# Patient Record
Sex: FEMALE | Race: AMERICAN INDIAN OR ALASKA NATIVE
[De-identification: names, ages, dates, MRNs, and addresses within clinical notes are randomized per-mention and may not be internally consistent; named-entity substitution may affect disease eponyms.]

---

## 2018-11-30 ENCOUNTER — HOSPITAL ENCOUNTER (OUTPATIENT)
Dept: HOSPITAL 42 - CATH | Age: 78
LOS: 1 days | Discharge: HOME | End: 2018-12-01
Attending: INTERNAL MEDICINE
Payer: MEDICARE

## 2018-11-30 VITALS — BODY MASS INDEX: 28.8 KG/M2

## 2018-11-30 DIAGNOSIS — I10: ICD-10-CM

## 2018-11-30 DIAGNOSIS — E78.5: ICD-10-CM

## 2018-11-30 DIAGNOSIS — I25.10: Primary | ICD-10-CM

## 2018-11-30 DIAGNOSIS — E78.00: ICD-10-CM

## 2018-11-30 DIAGNOSIS — Z79.899: ICD-10-CM

## 2018-11-30 DIAGNOSIS — Z79.82: ICD-10-CM

## 2018-11-30 DIAGNOSIS — I25.82: ICD-10-CM

## 2018-11-30 DIAGNOSIS — Z98.61: ICD-10-CM

## 2018-11-30 DIAGNOSIS — G56.03: ICD-10-CM

## 2018-11-30 DIAGNOSIS — E11.40: ICD-10-CM

## 2018-11-30 LAB
APTT BLD: 54.8 SECONDS (ref 25.1–36.5)
BASOPHILS # BLD AUTO: 0.01 K/MM3 (ref 0–2)
BASOPHILS NFR BLD: 0.2 % (ref 0–3)
BUN SERPL-MCNC: 27 MG/DL (ref 7–21)
CALCIUM SERPL-MCNC: 9.8 MG/DL (ref 8.4–10.5)
EOSINOPHIL # BLD: 0.2 10*3/UL (ref 0–0.7)
EOSINOPHIL NFR BLD: 4 % (ref 1.5–5)
ERYTHROCYTE [DISTWIDTH] IN BLOOD BY AUTOMATED COUNT: 15.4 % (ref 11.5–14.5)
GFR NON-AFRICAN AMERICAN: 53
GRANULOCYTES # BLD: 2.74 10*3/UL (ref 1.4–6.5)
GRANULOCYTES NFR BLD: 52.7 % (ref 50–68)
HDLC SERPL-MCNC: 26 MG/DL (ref 29–60)
HGB BLD-MCNC: 10.2 G/DL (ref 12–16)
INR PPP: 1.03
LDLC SERPL-MCNC: 72 MG/DL (ref 0–129)
LYMPHOCYTES # BLD: 1.8 10*3/UL (ref 1.2–3.4)
LYMPHOCYTES NFR BLD AUTO: 35.4 % (ref 22–35)
MCH RBC QN AUTO: 22.1 PG (ref 25–35)
MCHC RBC AUTO-ENTMCNC: 31.3 G/DL (ref 31–37)
MCV RBC AUTO: 70.6 FL (ref 80–105)
MONOCYTES # BLD AUTO: 0.4 10*3/UL (ref 0.1–0.6)
MONOCYTES NFR BLD: 7.7 % (ref 1–6)
PLATELET # BLD: 222 10^3/UL (ref 120–450)
PMV BLD AUTO: 10.3 FL (ref 7–11)
PROTHROMBIN TIME: 11.9 SECONDS (ref 9.4–12.5)
RBC # BLD AUTO: 4.62 10^6/UL (ref 3.5–6.1)
WBC # BLD AUTO: 5.2 10^3/UL (ref 4.5–11)

## 2018-11-30 PROCEDURE — 80061 LIPID PANEL: CPT

## 2018-11-30 PROCEDURE — 86900 BLOOD TYPING SEROLOGIC ABO: CPT

## 2018-11-30 PROCEDURE — 93005 ELECTROCARDIOGRAM TRACING: CPT

## 2018-11-30 PROCEDURE — 85730 THROMBOPLASTIN TIME PARTIAL: CPT

## 2018-11-30 PROCEDURE — 93458 L HRT ARTERY/VENTRICLE ANGIO: CPT

## 2018-11-30 PROCEDURE — 85025 COMPLETE CBC W/AUTO DIFF WBC: CPT

## 2018-11-30 PROCEDURE — 86850 RBC ANTIBODY SCREEN: CPT

## 2018-11-30 PROCEDURE — 80048 BASIC METABOLIC PNL TOTAL CA: CPT

## 2018-11-30 PROCEDURE — 99153 MOD SED SAME PHYS/QHP EA: CPT

## 2018-11-30 PROCEDURE — 82948 REAGENT STRIP/BLOOD GLUCOSE: CPT

## 2018-11-30 PROCEDURE — 99152 MOD SED SAME PHYS/QHP 5/>YRS: CPT

## 2018-11-30 PROCEDURE — 85610 PROTHROMBIN TIME: CPT

## 2018-11-30 PROCEDURE — 36415 COLL VENOUS BLD VENIPUNCTURE: CPT

## 2018-11-30 RX ADMIN — INSULIN LISPRO SCH U: 100 INJECTION, SUSPENSION SUBCUTANEOUS at 17:53

## 2018-11-30 NOTE — CARDCATH
PROCEDURE DATE:  11/30/2018



HISTORY:  The patient in the 78-year-old woman with history of hypertension

and diabetes mellitus and hypercholesterolemia with a history of PTCA in

the past who presents with exertional shortness of breath progressing to

symptoms between one to two blocks.



The patient's stress test earlier this year was unremarkable.  Because of

her ongoing symptoms, cardiac catheterization was recommended.



PROCEDURE:  Left heart catheterization with coronary aortography and left

ventriculogram followed by percutaneous transluminal coronary angioplasty

and stent of the circumflex lesion.



The right femoral artery was cannulated with a 6-[place] sheath.  There were

no complications.



I performed moderate sedation which included the presence of an independent

trained observer that assisted in monitoring the patient's level of

consciousness and physiologic status.  After administration of Versed and

fentanyl, my intra service time was 30 minutes.



The findings on catheterization revealed a right dominant circulation.  The

RCA was occluded in its proximal portion which is chronic.



The left main artery was unremarkable.



The LAD revealed a patent stent in its midportion.  There is a small third

diagonal vessel that is 80-90% stenosis.



The circumflex artery and obtuse marginal branch revealed diffuse

atherosclerosis.  The second branch of the obtuse marginal branch revealed

a 80-90% stenosis.



LV function was preserved with an EF of 50-55%.



The patient started on intravenous Angiomax under fluoroscopic guide, the

guiding catheter was placed in the ostium of the left main artery and 0.014

ATW wire was used to cross the lesion in the OM.



A 2.25 x 12 mm drug-eluting stent was placed and deployed at 12 ounces of

pressure.  Repeat coronary artery revealed an excellent result.



No residual stenosis and ANUJ III flow.



Angio-Seal was used to close the femoral artery site.  The patient

tolerated the procedure well.



In summary, the procedure was successful PTCA and stent of an obtuse

marginal branch lesion with a drug-eluting stent.



Cardiac catheterization revealed a chronically occluded RCA and new lesion

in the OM.



In addition, the LAD stent was patent.  LV function is preserved.



Given these findings, the patient will need to undergo a strict cardiac

risk reduction program.  Continue on aspirin and Plavix with Plavix for at

least a year.







__________________________________________

Richy Jordan MD





DD:  11/30/2018 10:34:58

DT:  11/30/2018 10:39:29

Job # [id number]

## 2018-11-30 NOTE — HP
DATE OF EXAM:  11/30/2018



HISTORY OF PRESENT ILLNESS:  The patient is a 78-year-old who was found to

have abnormal stress test done on 10/15/2018.  She was brought electively

for cardiac cath and had circumflex stented, being placed in observation. 

Denies any chest pain or shortness of breath.  No nausea, vomiting or

diarrhea.  No abdominal pain.



PAST MEDICAL HISTORY:  Significant for;

1.  Hypertension.

2.  Non-insulin-dependent diabetes.

3.  Hyperlipidemia.

4.  History of diabetic neuropathy.

5.  Spinal stenosis.

6.  Bilateral carpal tunnel syndrome.



ALLERGIES:  SHE IS ALLERGIC TO SHELLFISH.



PAST SURGICAL HISTORY:  Significant for bilateral cataract extraction.



MEDICATIONS:  At home, the patient is on valsartan 320 daily, Plavix 75

daily, diltiazem 120 daily, tramadol one tablet every six hours as needed,

aspirin 81 daily, hydralazine 25 every six hours, simvastatin 10 mg daily.



SOCIAL HISTORY:  She lives by herself.  Denies smoking, drinking or alcohol

use.



PHYSICAL EXAMINATION

GENERAL:  The patient is awake and alert.  Able to communicate.

VITAL SIGNS:  The patient is afebrile, pulse 66, respirations 18, blood

pressure 154/69.

LUNGS:  Bilateral fair airflow.  No rhonchi or crackle.

HEART:  S1, S2 audible.

ABDOMEN:  Soft, nontender.  No rebound, no guarding.

NEUROLOGIC:  The patient is awake, alert, oriented.  Able to communicate.

EXTREMITIES:  Right groin has a pressure bandage.



LABORATORY DATA:  WBC 5.2, hemoglobin 10, hematocrit 32.6, platelet of 222.

PT 11.9, INR 1.03.  Chemistry; sodium 144, potassium 4.4, chloride 111, CO2

of 23, BUN 27, creatinine 1.0, blood sugar 158.



ASSESSMENT:

1.  Status post _____ circumflex angioplasty.

2.  History of coronary artery disease.

3.  Hypertension.

4.  Hyperlipidemia.

5.  Non-insulin-dependent diabetes.



PLAN:  So, plan is the patient will be placed in observation.  We will

monitor blood sugar.  We will resume her usual medication.  We will monitor

her closely and possible discharge in a.m. if the patient remains stable.







__________________________________________

Guille Cueva MD





DD:  11/30/2018 11:55:01

DT:  11/30/2018 12:44:07

Job # [id number]

## 2018-11-30 NOTE — CARD
--------------- APPROVED REPORT --------------





Date of service: 11/30/2018



EKG Measurement

Heart [id number]

[id number]

[id number]

[id number]



<Conclusion>

AV sequential or dual chamber electronic pacemaker

## 2018-12-01 VITALS — RESPIRATION RATE: 17 BRPM | SYSTOLIC BLOOD PRESSURE: 154 MMHG | TEMPERATURE: 98.3 F | DIASTOLIC BLOOD PRESSURE: 76 MMHG

## 2018-12-01 VITALS — HEART RATE: 74 BPM

## 2018-12-01 VITALS — OXYGEN SATURATION: 99 %

## 2018-12-01 LAB
BASOPHILS # BLD AUTO: 0.02 K/MM3 (ref 0–2)
BASOPHILS NFR BLD: 0.3 % (ref 0–3)
BUN SERPL-MCNC: 23 MG/DL (ref 7–21)
CALCIUM SERPL-MCNC: 9.4 MG/DL (ref 8.4–10.5)
EOSINOPHIL # BLD: 0 10*3/UL (ref 0–0.7)
EOSINOPHIL NFR BLD: 0.4 % (ref 1.5–5)
ERYTHROCYTE [DISTWIDTH] IN BLOOD BY AUTOMATED COUNT: 15.6 % (ref 11.5–14.5)
GFR NON-AFRICAN AMERICAN: > 60
GRANULOCYTES # BLD: 5.29 10*3/UL (ref 1.4–6.5)
GRANULOCYTES NFR BLD: 69.3 % (ref 50–68)
HGB BLD-MCNC: 9.3 G/DL (ref 12–16)
LYMPHOCYTES # BLD: 1.6 10*3/UL (ref 1.2–3.4)
LYMPHOCYTES NFR BLD AUTO: 21.2 % (ref 22–35)
MCH RBC QN AUTO: 22.3 PG (ref 25–35)
MCHC RBC AUTO-ENTMCNC: 31.7 G/DL (ref 31–37)
MCV RBC AUTO: 70.3 FL (ref 80–105)
MONOCYTES # BLD AUTO: 0.7 10*3/UL (ref 0.1–0.6)
MONOCYTES NFR BLD: 8.8 % (ref 1–6)
PLATELET # BLD: 209 10^3/UL (ref 120–450)
PMV BLD AUTO: 11.9 FL (ref 7–11)
RBC # BLD AUTO: 4.17 10^6/UL (ref 3.5–6.1)
WBC # BLD AUTO: 7.6 10^3/UL (ref 4.5–11)

## 2018-12-01 RX ADMIN — INSULIN LISPRO SCH U: 100 INJECTION, SUSPENSION SUBCUTANEOUS at 10:32

## 2018-12-01 NOTE — DS
HISTORY OF PRESENT ILLNESS:  Patient is 78 years old seen and examined,

doing well.  Eating and tolerating.  No nausea, vomiting.  No diarrhea. 

Anxious to go home.  Bilateral leg swelling, ambulatory.



PHYSICAL EXAMINATION:

VITAL SIGNS:  She is afebrile, pulse 68, respirations 20 and blood pressure

145/77.

LUNGS:  Bilateral fair airflow.  No rhonchi or crackle.

HEART:  S1 and S2 audible.

ABDOMEN:  Soft, nontender.  No rebound.  No guarding.

NEUROLOGICAL:  She is awake, alert, oriented, communicative.



LABORATORY DATA:  WBC is 7.6, hemoglobin 9.3, hematocrit 29.3 and platelets

209.  Chemistry:  Sodium 141, potassium 4.1, chloride 110, CO2 of 24, BUN

23, creatinine 0.9, blood sugar is 134.



ASSESSMENT:

1.  Status post cardiac catheterization and status post angioplasty for

obtuse marginal branch.  Had drug-eluted stent.  Her left anterior

descending stent was found to be open.

2.  Hypertension.

3.  Coronary artery disease.

4.  Hyperlipidemia.

5.  Non-insulin-dependent diabetes.

6.  History of spinal stenosis.



PLAN:  The patient is being discharged home.  She is clinically and

hemodynamically stable.  She will continue her medications including Lyrica

50 mg at bedtime, Plavix 75 daily, metoprolol 25 daily, atorvastatin 20 mg

daily, aspirin 81 daily and she is on valsartan.  We will follow up in the

office.







__________________________________________

Guille Cueva MD





DD:  12/01/2018 11:06:35

DT:  12/01/2018 13:05:27

Job # [id number]

## 2018-12-01 NOTE — PN
DATE:  12/01/2018



REASON FOR DICTATION:  Covering for Dr. Richy Jordan



REASON FOR CONSULTATION:  History of CAD, status post PTCA.



SUBJECTIVE:  The patient denies any chest pain or shortness of breath, or

any palpitation.



PHYSICAL EXAMINATION:

GENERAL:  Not in apparent distress, sitting at the bedside having the

breakfast.  Denies any chest pain.

VITAL SIGNS:  Temperature afebrile, heart rate 60, blood pressure 122/59.

HEENT:  PERRLA.  Extraocular muscles intact.

NECK:  Supple.  No carotid bruits or thyromegaly.

CHEST:  Clear to auscultation.

HEART:  S1 and S2 regular.

ABDOMEN:  Soft.

EXTREMITIES:  Clubbing and cyanosis negative.



LABORATORY DATA:  Blood workup as follows; WBC 7.6, hemoglobin 9.7,

hematocrit 29.3, platelet count 209.  Chemistry shows sodium 141, potassium

4, chloride 110, carbon dioxide 24, anion gap of 4, BUN 23, and creatinine

0.9 and glucose 146.



IMPRESSION:  This is a 78-year-old female with past medical history

significant for coronary artery disease, status post percutaneous

transluminal coronary angioplasty in the past, who underwent yesterday

cardiac catheterization and a stent in left anterior descending as well as

stent in the obtuse marginal 1.  Right coronary artery is totally occluded.

Left ventricular ejection fraction of 55%.  Right groin appears okay. 

Electrolytes are within staci limits.



RECOMMENDATION:  The patient is okay to discharge.  Continue aspirin,

continue atorvastatin, continue metoprolol, continue Plavix.  Follow up

with Dr. Jordan.



Thank you  ____ for providing us the opportunity in taking care of the

patient, Alexandra Oconnell.  Upon discharge the patient will be followed up

with Dr. Jordan in his office.







__________________________________________

Carroll Trujillo MD





DD:  12/01/2018 9:29:00

DT:  12/01/2018 10:36:50

Job # [id number]

## 2019-07-25 NOTE — CARD
--------------- APPROVED REPORT --------------





Date of service: 12/01/2018



EKG Measurement

Heart [id number]

[id number]

[id number]

[id number]

[id number]



<Conclusion>

AV sequential or dual chamber electronic pacemaker [de-identified] : 83 year old female presents for an evaluation of right knee pain that began on 7/21/2019 after walking for an extended period of time the previous day, though she cannot cite a specific injury. The patient reports that she is s/p right knee arthroscopy in August 2012. Today she rates her pain a 3/10 and describes it as a dull aching pain that is located along the medial and posterior aspects of her right knee that is nearly constant in nature. Her symptoms are exacerbated with walking, bending, and climbing stairs. She has been utilizing Extra Strength Tylenol to alleviate her symptoms. Of note, the patient has a history of CAD and MI, she has had 2 cardiac stent placed in 2018 and is on blood thinners.